# Patient Record
Sex: FEMALE
[De-identification: names, ages, dates, MRNs, and addresses within clinical notes are randomized per-mention and may not be internally consistent; named-entity substitution may affect disease eponyms.]

---

## 2023-03-31 ENCOUNTER — APPOINTMENT (OUTPATIENT)
Dept: OBGYN | Facility: CLINIC | Age: 28
End: 2023-03-31

## 2024-03-15 ENCOUNTER — NON-APPOINTMENT (OUTPATIENT)
Age: 29
End: 2024-03-15

## 2024-03-15 ENCOUNTER — ASOB RESULT (OUTPATIENT)
Age: 29
End: 2024-03-15

## 2024-03-15 ENCOUNTER — APPOINTMENT (OUTPATIENT)
Dept: OBGYN | Facility: CLINIC | Age: 29
End: 2024-03-15
Payer: COMMERCIAL

## 2024-03-15 VITALS
SYSTOLIC BLOOD PRESSURE: 114 MMHG | BODY MASS INDEX: 23.04 KG/M2 | WEIGHT: 152 LBS | HEIGHT: 68 IN | DIASTOLIC BLOOD PRESSURE: 68 MMHG

## 2024-03-15 DIAGNOSIS — Z00.00 ENCOUNTER FOR GENERAL ADULT MEDICAL EXAMINATION W/OUT ABNORMAL FINDINGS: ICD-10-CM

## 2024-03-15 DIAGNOSIS — Z80.41 FAMILY HISTORY OF MALIGNANT NEOPLASM OF OVARY: ICD-10-CM

## 2024-03-15 DIAGNOSIS — N97.9 FEMALE INFERTILITY, UNSPECIFIED: ICD-10-CM

## 2024-03-15 DIAGNOSIS — Z78.9 OTHER SPECIFIED HEALTH STATUS: ICD-10-CM

## 2024-03-15 PROCEDURE — 99205 OFFICE O/P NEW HI 60 MIN: CPT

## 2024-03-15 PROCEDURE — 76830 TRANSVAGINAL US NON-OB: CPT

## 2024-03-15 NOTE — PHYSICAL EXAM
[Chaperone Present] : A chaperone was present in the examining room during all aspects of the physical examination [Soft] : soft [Non-tender] : non-tender [Non-distended] : non-distended [No Lesions] : no lesions [No Mass] : no mass [Labia Minora] : normal [Labia Majora] : normal [Scant] : There was scant vaginal bleeding [Normal] : normal [Normal Position] : in a normal position [Uterine Adnexae] : non-palpable [FreeTextEntry1] : NASRA Shay [Tenderness] : nontender

## 2024-03-15 NOTE — COUNSELING
[Vitamins/Supplements] : vitamins/supplements [Nutrition/ Exercise/ Weight Management] : nutrition, exercise, weight management [Lab Results] : lab results [Medication Management] : medication management

## 2024-03-15 NOTE — HISTORY OF PRESENT ILLNESS
[Patient reported PAP Smear was normal] : Patient reported PAP Smear was normal [N] : Patient denies prior pregnancies [Y] : Patient is sexually active [Currently Active] : currently active [Men] : men [No] : No [FreeTextEntry1] : 28-year-old G0 here for consultation for second opinion of prolonged abnormal uterine bleeding and diagnosis and management of PCOS. Patient has been an intimate relationship with monogamous male partner since age of 17-18 on using any form of prevention for pregnancy all the way until age of 22. Was then instructed to trial oral contraceptive for prevention of pregnancy which did regulate her cycles and allow for monthly periods. Patient was on OCPs from [age] yo And then self discontinued due to wariness of prolonged drug exposure. Patient has since had irregular vaginal bleeding also had flareup of acne and episodes of hirsutism on the chin that have improved with increasing exercise. Bleeding however has remained irregular.   With the same partner who has never had children outside of this prior partnership patient has still not conceived in the last 3 years without any prevention of pregnancy with regular intercourse.  For the last several months she has experienced daily bleeding since January 2024 until today's appointment.  She denies dizziness fatigue.  Volume of bleeding varies every few weeks from heavy bleeding where she is soaking through many pads to light spotting that continues for several days.   She had prior ultrasounds performed in 2022 concerning for endometrial polyp and confirming PCOS imaging in 2023 however showed absence of endometrial polyp with thickened endometrial lining. She has had workup with endocrinology who noted excess androgens and had previously prescribed oral contraceptive metformin and spironolactone for management of her PCOS symptoms.   Patient does desire future fertility study and school not ready to have children at this time.  GYN history: Patient denies abnormal Paps STIs or fibroids. PCOS  Medical history: PCOS  PSH: Denies   [PapSmeardate] : 2022 [TextBox_31] : WIL [LMPDate] : 1/17/24

## 2024-03-25 LAB
ABO + RH PNL BLD: NORMAL
ALBUMIN SERPL ELPH-MCNC: 4.6 G/DL
ALP BLD-CCNC: 89 U/L
ALT SERPL-CCNC: 13 U/L
ANION GAP SERPL CALC-SCNC: 12 MMOL/L
ANTI-MUELLERIAN HORMONE: 16.1 NG/ML
AST SERPL-CCNC: 21 U/L
BASOPHILS # BLD AUTO: 0.03 K/UL
BASOPHILS NFR BLD AUTO: 0.4 %
BILIRUB SERPL-MCNC: 0.3 MG/DL
BUN SERPL-MCNC: 16 MG/DL
CALCIUM SERPL-MCNC: 9.7 MG/DL
CHLORIDE SERPL-SCNC: 102 MMOL/L
CO2 SERPL-SCNC: 25 MMOL/L
CREAT SERPL-MCNC: 1.1 MG/DL
EGFR: 71 ML/MIN/1.73M2
EOSINOPHIL # BLD AUTO: 0.05 K/UL
EOSINOPHIL NFR BLD AUTO: 0.7 %
FSH SERPL-MCNC: 6.8 IU/L
GLUCOSE SERPL-MCNC: 85 MG/DL
HCT VFR BLD CALC: 39 %
HGB BLD-MCNC: 12.5 G/DL
IMM GRANULOCYTES NFR BLD AUTO: 0.1 %
LH SERPL-ACNC: 24.8 IU/L
LYMPHOCYTES # BLD AUTO: 1.5 K/UL
LYMPHOCYTES NFR BLD AUTO: 22.4 %
MAN DIFF?: NORMAL
MCHC RBC-ENTMCNC: 29.4 PG
MCHC RBC-ENTMCNC: 32.1 GM/DL
MCV RBC AUTO: 91.8 FL
MONOCYTES # BLD AUTO: 0.67 K/UL
MONOCYTES NFR BLD AUTO: 10 %
NEUTROPHILS # BLD AUTO: 4.43 K/UL
NEUTROPHILS NFR BLD AUTO: 66.4 %
PLATELET # BLD AUTO: 255 K/UL
POTASSIUM SERPL-SCNC: 4.7 MMOL/L
PROT SERPL-MCNC: 6.9 G/DL
RBC # BLD: 4.25 M/UL
RBC # FLD: 12.3 %
SODIUM SERPL-SCNC: 138 MMOL/L
TSH SERPL-ACNC: 1.35 UIU/ML
WBC # FLD AUTO: 6.69 K/UL

## 2024-04-08 ENCOUNTER — APPOINTMENT (OUTPATIENT)
Dept: OBGYN | Facility: CLINIC | Age: 29
End: 2024-04-08
Payer: COMMERCIAL

## 2024-04-08 VITALS
WEIGHT: 154 LBS | BODY MASS INDEX: 23.34 KG/M2 | SYSTOLIC BLOOD PRESSURE: 130 MMHG | HEIGHT: 68 IN | DIASTOLIC BLOOD PRESSURE: 68 MMHG

## 2024-04-08 DIAGNOSIS — E28.2 POLYCYSTIC OVARIAN SYNDROME: ICD-10-CM

## 2024-04-08 DIAGNOSIS — N84.0 POLYP OF CORPUS UTERI: ICD-10-CM

## 2024-04-08 DIAGNOSIS — N93.9 ABNORMAL UTERINE AND VAGINAL BLEEDING, UNSPECIFIED: ICD-10-CM

## 2024-04-08 PROCEDURE — 99214 OFFICE O/P EST MOD 30 MIN: CPT

## 2024-04-08 NOTE — HISTORY OF PRESENT ILLNESS
[FreeTextEntry1] : 28-year-old G0 here for consultation for second opinion of prolonged abnormal uterine bleeding and diagnosis and management of PCOS and endometrial polyp. Since workup patient reports menses completed on 31 March.   She had prior ultrasounds performed in 2022 concerning for endometrial polyp and confirming PCOS imaging in 2023 however showed absence of endometrial polyp with thickened endometrial lining. She has had workup with endocrinology who noted excess androgens and had previously prescribed oral contraceptive metformin and spironolactone for management of her PCOS symptoms.   Patient does desire future fertility study and school not ready to have children at this time.  GYN history: Patient denies abnormal Paps STIs or fibroids. PCOS  Medical history: PCOS  PSH: Denies   [Patient reported PAP Smear was normal] : Patient reported PAP Smear was normal [Y] : Patient is sexually active [N] : Patient denies prior pregnancies [PapSmeardate] : 2022 [TextBox_31] : WIL [LMPDate] : 3/31/24 [Currently Active] : currently active [Men] : men [No] : No

## 2025-07-11 ENCOUNTER — APPOINTMENT (OUTPATIENT)
Dept: OBGYN | Facility: CLINIC | Age: 30
End: 2025-07-11
Payer: MEDICAID

## 2025-07-11 VITALS
BODY MASS INDEX: 24.1 KG/M2 | WEIGHT: 159 LBS | HEIGHT: 68 IN | DIASTOLIC BLOOD PRESSURE: 74 MMHG | SYSTOLIC BLOOD PRESSURE: 126 MMHG

## 2025-07-11 PROCEDURE — 99395 PREV VISIT EST AGE 18-39: CPT

## 2025-07-13 LAB
C TRACH RRNA SPEC QL NAA+PROBE: NOT DETECTED
HPV HIGH+LOW RISK DNA PNL CVX: NOT DETECTED
N GONORRHOEA RRNA SPEC QL NAA+PROBE: NOT DETECTED
SOURCE TP AMPLIFICATION: NORMAL

## 2025-07-15 LAB — CYTOLOGY CVX/VAG DOC THIN PREP: NORMAL

## 2025-07-17 ENCOUNTER — APPOINTMENT (OUTPATIENT)
Dept: OBGYN | Facility: CLINIC | Age: 30
End: 2025-07-17